# Patient Record
Sex: MALE | URBAN - METROPOLITAN AREA
[De-identification: names, ages, dates, MRNs, and addresses within clinical notes are randomized per-mention and may not be internally consistent; named-entity substitution may affect disease eponyms.]

---

## 2022-11-09 ENCOUNTER — OFFICE VISIT (OUTPATIENT)
Dept: URBAN - METROPOLITAN AREA CLINIC 6 | Facility: CLINIC | Age: 22
End: 2022-11-09
Payer: MEDICAID

## 2022-11-09 DIAGNOSIS — H52.13 MYOPIA, BILATERAL: ICD-10-CM

## 2022-11-09 DIAGNOSIS — Z01.00 ENCOUNTER FOR EXAM OF EYES AND VISION WITH NORMAL FINDINGS: Primary | ICD-10-CM

## 2022-11-09 PROCEDURE — S0621 ROUTINE OPHTHALMOLOGICAL EXA: HCPCS | Performed by: OPTOMETRIST

## 2022-11-09 PROCEDURE — 92015 DETERMINE REFRACTIVE STATE: CPT | Performed by: OPTOMETRIST

## 2022-11-09 ASSESSMENT — INTRAOCULAR PRESSURE
OD: 11
OS: 9

## 2022-11-09 ASSESSMENT — VISUAL ACUITY
OS: 20/20
OD: 20/20

## 2022-11-09 NOTE — IMPRESSION/PLAN
Impression: Encounter for exam of eyes and vision with normal findings: Z01.00. Plan: Dilated exam was performed and was unremarkable. Prescription given for glasses.